# Patient Record
Sex: MALE | Race: OTHER | HISPANIC OR LATINO | URBAN - METROPOLITAN AREA
[De-identification: names, ages, dates, MRNs, and addresses within clinical notes are randomized per-mention and may not be internally consistent; named-entity substitution may affect disease eponyms.]

---

## 2020-09-25 ENCOUNTER — OUTPATIENT (OUTPATIENT)
Dept: OUTPATIENT SERVICES | Facility: HOSPITAL | Age: 20
LOS: 1 days | End: 2020-09-25

## 2020-09-25 ENCOUNTER — TRANSCRIPTION ENCOUNTER (OUTPATIENT)
Age: 20
End: 2020-09-25

## 2020-09-25 VITALS
OXYGEN SATURATION: 98 % | TEMPERATURE: 97 F | SYSTOLIC BLOOD PRESSURE: 106 MMHG | WEIGHT: 110.01 LBS | DIASTOLIC BLOOD PRESSURE: 66 MMHG | HEART RATE: 91 BPM | HEIGHT: 65.5 IN | RESPIRATION RATE: 16 BRPM

## 2020-09-25 DIAGNOSIS — K08.409 PARTIAL LOSS OF TEETH, UNSPECIFIED CAUSE, UNSPECIFIED CLASS: Chronic | ICD-10-CM

## 2020-09-25 DIAGNOSIS — M26.02 MAXILLARY HYPOPLASIA: ICD-10-CM

## 2020-09-25 LAB
BLD GP AB SCN SERPL QL: NEGATIVE — SIGNIFICANT CHANGE UP
HCT VFR BLD CALC: 47.8 % — SIGNIFICANT CHANGE UP (ref 39–50)
HGB BLD-MCNC: 15.7 G/DL — SIGNIFICANT CHANGE UP (ref 13–17)
MCHC RBC-ENTMCNC: 26.5 PG — LOW (ref 27–34)
MCHC RBC-ENTMCNC: 32.8 % — SIGNIFICANT CHANGE UP (ref 32–36)
MCV RBC AUTO: 80.7 FL — SIGNIFICANT CHANGE UP (ref 80–100)
NRBC # FLD: 0 K/UL — SIGNIFICANT CHANGE UP (ref 0–0)
PLATELET # BLD AUTO: 234 K/UL — SIGNIFICANT CHANGE UP (ref 150–400)
PMV BLD: 9.3 FL — SIGNIFICANT CHANGE UP (ref 7–13)
RBC # BLD: 5.92 M/UL — HIGH (ref 4.2–5.8)
RBC # FLD: 11.4 % — SIGNIFICANT CHANGE UP (ref 10.3–14.5)
RH IG SCN BLD-IMP: POSITIVE — SIGNIFICANT CHANGE UP
WBC # BLD: 5.7 K/UL — SIGNIFICANT CHANGE UP (ref 3.8–10.5)
WBC # FLD AUTO: 5.7 K/UL — SIGNIFICANT CHANGE UP (ref 3.8–10.5)

## 2020-09-25 RX ORDER — SODIUM CHLORIDE 9 MG/ML
1000 INJECTION, SOLUTION INTRAVENOUS
Refills: 0 | Status: DISCONTINUED | OUTPATIENT
Start: 2020-10-01 | End: 2020-10-02

## 2020-09-25 NOTE — H&P PST ADULT - NSANTHOSAYNRD_GEN_A_CORE
never tested/No. LESLIE screening performed.  STOP BANG Legend: 0-2 = LOW Risk; 3-4 = INTERMEDIATE Risk; 5-8 = HIGH Risk

## 2020-09-25 NOTE — H&P PST ADULT - ASSESSMENT
20 yrs old male with h/o maxillary hypoplasia and mandibular hyperplasia presents for preop eval to have LEFORT 1 mandibular sagittal split osteotomy

## 2020-09-25 NOTE — H&P PST ADULT - NSICDXPROBLEM_GEN_ALL_CORE_FT
PROBLEM DIAGNOSES  Problem: Maxillary hypoplasia  Assessment and Plan:        PROBLEM DIAGNOSES  Problem: Maxillary hypoplasia  Assessment and Plan: Pt scheduled for LEFORT 1 mandibular sagittal spilt soteotomy.  labs pending,  preop instructions provided to pt.   Famotidine provided to pt with instructions.  Pt's COVID testing is on 9/28/20

## 2020-09-25 NOTE — H&P PST ADULT - RS GEN PE MLT RESP DETAILS PC
no wheezes/good air movement/respirations non-labored/clear to auscultation bilaterally/no rales/no rhonchi

## 2020-09-26 DIAGNOSIS — Z01.818 ENCOUNTER FOR OTHER PREPROCEDURAL EXAMINATION: ICD-10-CM

## 2020-09-26 PROBLEM — Z00.00 ENCOUNTER FOR PREVENTIVE HEALTH EXAMINATION: Status: ACTIVE | Noted: 2020-09-26

## 2020-09-28 ENCOUNTER — APPOINTMENT (OUTPATIENT)
Dept: DISASTER EMERGENCY | Facility: CLINIC | Age: 20
End: 2020-09-28

## 2020-09-29 LAB — SARS-COV-2 N GENE NPH QL NAA+PROBE: NOT DETECTED

## 2020-10-01 ENCOUNTER — TRANSCRIPTION ENCOUNTER (OUTPATIENT)
Age: 20
End: 2020-10-01

## 2020-10-01 ENCOUNTER — INPATIENT (INPATIENT)
Facility: HOSPITAL | Age: 20
LOS: 0 days | Discharge: HOME | End: 2020-10-02
Attending: DENTIST | Admitting: DENTIST

## 2020-10-01 VITALS
RESPIRATION RATE: 17 BRPM | TEMPERATURE: 98 F | WEIGHT: 110.01 LBS | OXYGEN SATURATION: 98 % | SYSTOLIC BLOOD PRESSURE: 118 MMHG | HEIGHT: 65.5 IN | HEART RATE: 82 BPM | DIASTOLIC BLOOD PRESSURE: 50 MMHG

## 2020-10-01 DIAGNOSIS — M26.02 MAXILLARY HYPOPLASIA: ICD-10-CM

## 2020-10-01 DIAGNOSIS — K08.409 PARTIAL LOSS OF TEETH, UNSPECIFIED CAUSE, UNSPECIFIED CLASS: Chronic | ICD-10-CM

## 2020-10-01 LAB — RH IG SCN BLD-IMP: POSITIVE — SIGNIFICANT CHANGE UP

## 2020-10-01 RX ORDER — METOCLOPRAMIDE HCL 10 MG
10 TABLET ORAL ONCE
Refills: 0 | Status: COMPLETED | OUTPATIENT
Start: 2020-10-01 | End: 2020-10-02

## 2020-10-01 RX ORDER — FLUTICASONE PROPIONATE 50 MCG
1 SPRAY, SUSPENSION NASAL ONCE
Refills: 0 | Status: DISCONTINUED | OUTPATIENT
Start: 2020-10-01 | End: 2020-10-02

## 2020-10-01 RX ORDER — HYDROMORPHONE HYDROCHLORIDE 2 MG/ML
0.25 INJECTION INTRAMUSCULAR; INTRAVENOUS; SUBCUTANEOUS
Refills: 0 | Status: DISCONTINUED | OUTPATIENT
Start: 2020-10-01 | End: 2020-10-02

## 2020-10-01 RX ORDER — IBUPROFEN 200 MG
600 TABLET ORAL EVERY 6 HOURS
Refills: 0 | Status: DISCONTINUED | OUTPATIENT
Start: 2020-10-01 | End: 2020-10-02

## 2020-10-01 RX ORDER — PENICILLIN G POTASSIUM 5000000 [IU]/1
2 POWDER, FOR SOLUTION INTRAMUSCULAR; INTRAPLEURAL; INTRATHECAL; INTRAVENOUS EVERY 4 HOURS
Refills: 0 | Status: DISCONTINUED | OUTPATIENT
Start: 2020-10-01 | End: 2020-10-02

## 2020-10-01 RX ORDER — SODIUM CHLORIDE 0.65 %
1 AEROSOL, SPRAY (ML) NASAL
Refills: 0 | Status: DISCONTINUED | OUTPATIENT
Start: 2020-10-01 | End: 2020-10-02

## 2020-10-01 RX ORDER — ACETAMINOPHEN 500 MG
650 TABLET ORAL EVERY 6 HOURS
Refills: 0 | Status: DISCONTINUED | OUTPATIENT
Start: 2020-10-01 | End: 2020-10-02

## 2020-10-01 RX ORDER — SODIUM CHLORIDE 9 MG/ML
1000 INJECTION, SOLUTION INTRAVENOUS
Refills: 0 | Status: DISCONTINUED | OUTPATIENT
Start: 2020-10-01 | End: 2020-10-02

## 2020-10-01 RX ORDER — MORPHINE SULFATE 50 MG/1
2 CAPSULE, EXTENDED RELEASE ORAL ONCE
Refills: 0 | Status: DISCONTINUED | OUTPATIENT
Start: 2020-10-01 | End: 2020-10-02

## 2020-10-01 RX ORDER — OXYCODONE HYDROCHLORIDE 5 MG/1
5 TABLET ORAL EVERY 4 HOURS
Refills: 0 | Status: DISCONTINUED | OUTPATIENT
Start: 2020-10-01 | End: 2020-10-02

## 2020-10-01 RX ORDER — HYDROMORPHONE HYDROCHLORIDE 2 MG/ML
0.5 INJECTION INTRAMUSCULAR; INTRAVENOUS; SUBCUTANEOUS
Refills: 0 | Status: DISCONTINUED | OUTPATIENT
Start: 2020-10-01 | End: 2020-10-02

## 2020-10-01 RX ORDER — OXYMETAZOLINE HYDROCHLORIDE 0.5 MG/ML
1 SPRAY NASAL
Refills: 0 | Status: DISCONTINUED | OUTPATIENT
Start: 2020-10-01 | End: 2020-10-02

## 2020-10-01 RX ORDER — ONDANSETRON 8 MG/1
4 TABLET, FILM COATED ORAL ONCE
Refills: 0 | Status: COMPLETED | OUTPATIENT
Start: 2020-10-01 | End: 2020-10-02

## 2020-10-01 RX ORDER — ONDANSETRON 8 MG/1
4 TABLET, FILM COATED ORAL EVERY 8 HOURS
Refills: 0 | Status: DISCONTINUED | OUTPATIENT
Start: 2020-10-01 | End: 2020-10-02

## 2020-10-01 RX ORDER — CHLORHEXIDINE GLUCONATE 213 G/1000ML
15 SOLUTION TOPICAL
Refills: 0 | Status: DISCONTINUED | OUTPATIENT
Start: 2020-10-01 | End: 2020-10-02

## 2020-10-01 RX ORDER — OXYCODONE HYDROCHLORIDE 5 MG/1
10 TABLET ORAL EVERY 4 HOURS
Refills: 0 | Status: DISCONTINUED | OUTPATIENT
Start: 2020-10-01 | End: 2020-10-02

## 2020-10-01 RX ADMIN — SODIUM CHLORIDE 30 MILLILITER(S): 9 INJECTION, SOLUTION INTRAVENOUS at 08:57

## 2020-10-01 RX ADMIN — HYDROMORPHONE HYDROCHLORIDE 0.5 MILLIGRAM(S): 2 INJECTION INTRAMUSCULAR; INTRAVENOUS; SUBCUTANEOUS at 22:45

## 2020-10-01 RX ADMIN — HYDROMORPHONE HYDROCHLORIDE 0.5 MILLIGRAM(S): 2 INJECTION INTRAMUSCULAR; INTRAVENOUS; SUBCUTANEOUS at 22:35

## 2020-10-01 NOTE — H&P ADULT - NSHPSOCIALHISTORY_GEN_ALL_CORE
ocial History:  · Marital Status	Single  · Occupation	unemployed  · Lives With	parents   Substance Use History:  · Substance Use	never used  caffeine   Alcohol Use History:  · Have you ever consumed alcohol	yes...  · Alcohol Type	wine; liquor  · Alcohol Frequency	monthly or less  socially only  · Problems Related to Alcohol Use	no  · 1. Have you felt you ought to CUT down on your drinking?	no  · 2. Have people ANNOYED you by criticizing your drinking?	no  · 3. Have you ever felt bad or GUILTY about your drinking?	no  · 4. Have you ever needed an "EYE OPENER", a drink first thing in the morning to steady your nerves or get rid of a hangover?	no     Tobacco Usage Definitions:   Tobacco Usage:  · Tobacco Usage: Never smoker   Passive Smoke Exposure:  · Passive Smoke Exposure	No

## 2020-10-01 NOTE — H&P ADULT - NSHPREVIEWOFSYSTEMS_GEN_ALL_CORE
Review of Systems:   · Negative General Symptoms	no fever; no chills  · Skin/Breast	negative  · Ophthalmologic	negative  · ENMT	negative  · Negative Respiratory and Thorax Symptoms	no wheezing; no dyspnea; no cough  · Negative Cardiovascular Symptoms	no chest pain; no palpitations; no dyspnea on exertion  · Gastrointestinal	negative  · Genitourinary	negative  · Musculoskeletal	negative  · Negative Neurological Symptoms	no weakness; no paresthesias; no difficulty walking  · Psychiatric	negative  · Hematology/Lymphatics	negative  · Negative Endocrine Symptoms	no cold intolerance; no heat intolerance  · Allergic/Immunologic	negative

## 2020-10-01 NOTE — H&P ADULT - NSHPPHYSICALEXAM_GEN_ALL_CORE
Physical Exam:  · Constitutional	well-developed; well-groomed; well-nourished; no distress  · Eyes	EOMI; PERRL; no drainage or redness  · ENMT	braces upper and lower teeth  · Neck	supple; no JVD  · Breasts	not examined  · Back	No deformity or limitation of movement  · Respiratory	good air movement; respirations non-labored; clear to auscultation bilaterally; no rales; no rhonchi; no wheezes  · Cardiovascular	  · Cardiovascular Details	regular rate and rhythm  no rub  · Cardiovascular Details	positive S1; positive S2  · Gastrointestinal	Soft, non-tender, no hepatosplenomegaly, normal bowel sounds  · Genitourinary	patient refused  · Rectal	patient refused  · Extremities	No cyanosis, clubbing or edema  · Vascular	Equal and normal pulses (carotid, femoral, dorsalis pedis)  · Neurological	Alert & oriented; no sensory, motor or coordination deficits, normal reflexes  · Skin	No lesions; no rash  · Lymph Nodes	No lymphadedenopathy  · Musculoskeletal	No joint pain, swelling or deformity; no limitation of movement  · Psychiatric	normal affect; normal behavior

## 2020-10-01 NOTE — H&P ADULT - ASSESSMENT
20 yrs old male with h/o maxillary hypoplasia and mandibular hyperplasia presents for preop eval to have LEFORT 1 mandibular sagittal split osteotomy on 10/01/2020.

## 2020-10-01 NOTE — H&P ADULT - HISTORY OF PRESENT ILLNESS
20 yrs old male with h/o maxillary hypoplasia and mandibular hyperplasia presents for preop eval to have LEFORT 1 and mandibular sagittal split osteotomy on 10/01/2020.

## 2020-10-02 ENCOUNTER — TRANSCRIPTION ENCOUNTER (OUTPATIENT)
Age: 20
End: 2020-10-02

## 2020-10-02 VITALS
RESPIRATION RATE: 18 BRPM | OXYGEN SATURATION: 98 % | TEMPERATURE: 98 F | DIASTOLIC BLOOD PRESSURE: 76 MMHG | HEART RATE: 100 BPM | SYSTOLIC BLOOD PRESSURE: 115 MMHG

## 2020-10-02 RX ORDER — CHLORHEXIDINE GLUCONATE 213 G/1000ML
15 SOLUTION TOPICAL
Qty: 210 | Refills: 0
Start: 2020-10-02 | End: 2020-10-08

## 2020-10-02 RX ORDER — FLUTICASONE PROPIONATE 50 MCG
1 SPRAY, SUSPENSION NASAL
Qty: 7 | Refills: 0
Start: 2020-10-02 | End: 2020-10-08

## 2020-10-02 RX ORDER — OXYCODONE HYDROCHLORIDE 5 MG/1
10 TABLET ORAL
Qty: 120 | Refills: 0
Start: 2020-10-02 | End: 2020-10-04

## 2020-10-02 RX ORDER — AMOXICILLIN 250 MG/5ML
10 SUSPENSION, RECONSTITUTED, ORAL (ML) ORAL
Qty: 210 | Refills: 0
Start: 2020-10-02 | End: 2020-10-08

## 2020-10-02 RX ORDER — IBUPROFEN 200 MG
30 TABLET ORAL
Qty: 840 | Refills: 0
Start: 2020-10-02 | End: 2020-10-08

## 2020-10-02 RX ORDER — OXYCODONE HYDROCHLORIDE 5 MG/1
5 TABLET ORAL
Qty: 60 | Refills: 0
Start: 2020-10-02 | End: 2020-10-04

## 2020-10-02 RX ORDER — OXYMETAZOLINE HYDROCHLORIDE 0.5 MG/ML
1 SPRAY NASAL
Qty: 14 | Refills: 0
Start: 2020-10-02 | End: 2020-10-08

## 2020-10-02 RX ORDER — ACETAMINOPHEN 500 MG
20.31 TABLET ORAL
Qty: 568.68 | Refills: 0
Start: 2020-10-02 | End: 2020-10-08

## 2020-10-02 RX ORDER — INFLUENZA VIRUS VACCINE 15; 15; 15; 15 UG/.5ML; UG/.5ML; UG/.5ML; UG/.5ML
0.5 SUSPENSION INTRAMUSCULAR ONCE
Refills: 0 | Status: DISCONTINUED | OUTPATIENT
Start: 2020-10-02 | End: 2020-10-02

## 2020-10-02 RX ORDER — SODIUM CHLORIDE 0.65 %
1 AEROSOL, SPRAY (ML) NASAL
Qty: 84 | Refills: 0
Start: 2020-10-02 | End: 2020-10-08

## 2020-10-02 RX ADMIN — Medication 10 MILLIGRAM(S): at 04:21

## 2020-10-02 RX ADMIN — OXYMETAZOLINE HYDROCHLORIDE 1 SPRAY(S): 0.5 SPRAY NASAL at 05:05

## 2020-10-02 RX ADMIN — OXYCODONE HYDROCHLORIDE 5 MILLIGRAM(S): 5 TABLET ORAL at 16:41

## 2020-10-02 RX ADMIN — OXYCODONE HYDROCHLORIDE 5 MILLIGRAM(S): 5 TABLET ORAL at 17:12

## 2020-10-02 RX ADMIN — Medication 1 SPRAY(S): at 08:30

## 2020-10-02 RX ADMIN — PENICILLIN G POTASSIUM 100 MILLION UNIT(S): 5000000 POWDER, FOR SOLUTION INTRAMUSCULAR; INTRAPLEURAL; INTRATHECAL; INTRAVENOUS at 00:14

## 2020-10-02 RX ADMIN — PENICILLIN G POTASSIUM 100 MILLION UNIT(S): 5000000 POWDER, FOR SOLUTION INTRAMUSCULAR; INTRAPLEURAL; INTRATHECAL; INTRAVENOUS at 05:05

## 2020-10-02 RX ADMIN — Medication 1 SPRAY(S): at 10:32

## 2020-10-02 RX ADMIN — Medication 600 MILLIGRAM(S): at 06:00

## 2020-10-02 RX ADMIN — Medication 1 SPRAY(S): at 11:32

## 2020-10-02 RX ADMIN — Medication 650 MILLIGRAM(S): at 12:13

## 2020-10-02 RX ADMIN — Medication 650 MILLIGRAM(S): at 04:29

## 2020-10-02 RX ADMIN — Medication 650 MILLIGRAM(S): at 03:59

## 2020-10-02 RX ADMIN — Medication 1 SPRAY(S): at 00:47

## 2020-10-02 RX ADMIN — Medication 1 SPRAY(S): at 04:01

## 2020-10-02 RX ADMIN — ONDANSETRON 4 MILLIGRAM(S): 8 TABLET, FILM COATED ORAL at 00:12

## 2020-10-02 RX ADMIN — Medication 650 MILLIGRAM(S): at 11:43

## 2020-10-02 RX ADMIN — CHLORHEXIDINE GLUCONATE 15 MILLILITER(S): 213 SOLUTION TOPICAL at 05:05

## 2020-10-02 RX ADMIN — PENICILLIN G POTASSIUM 100 MILLION UNIT(S): 5000000 POWDER, FOR SOLUTION INTRAMUSCULAR; INTRAPLEURAL; INTRATHECAL; INTRAVENOUS at 10:30

## 2020-10-02 RX ADMIN — Medication 1 SPRAY(S): at 09:30

## 2020-10-02 RX ADMIN — Medication 1 SPRAY(S): at 00:35

## 2020-10-02 RX ADMIN — Medication 1 SPRAY(S): at 01:55

## 2020-10-02 RX ADMIN — Medication 1 SPRAY(S): at 03:59

## 2020-10-02 RX ADMIN — Medication 1 SPRAY(S): at 12:00

## 2020-10-02 RX ADMIN — Medication 1 SPRAY(S): at 07:13

## 2020-10-02 RX ADMIN — Medication 600 MILLIGRAM(S): at 05:05

## 2020-10-02 NOTE — DISCHARGE NOTE PROVIDER - NSDCFUADDINST_GEN_ALL_CORE_FT
Sinus precaution  -No use of straw  -No spitting  -No blowing nose   -Sneeze while mouth open   -No pressure to face Sinus precaution  -No use of straw  -No spitting  -No blowing nose   -No pressure to face  -Sneeze while mouth open

## 2020-10-02 NOTE — DISCHARGE NOTE PROVIDER - NSDCMRMEDTOKEN_GEN_ALL_CORE_FT
acetaminophen 160 mg/5 mL oral suspension: 20.31 milliliter(s) orally every 6 hours  amoxicillin 250 mg/5 mL oral suspension: 10 milliliter(s) orally 3 times a day   fluticasone 50 mcg/inh nasal spray: 1 spray(s) nasal once  ibuprofen 100 mg/5 mL oral suspension: 30 milliliter(s) orally every 6 hours  oxyCODONE 5 mg/5 mL oral solution: 5 milliliter(s) orally every 6 hours MDD:20ml prn moderate pain 4-6  oxyCODONE 5 mg/5 mL oral solution: 10 milliliter(s) orally every 6 hours MDD:40ml prn severe pain 7-10  oxymetazoline 0.05% nasal spray: 1 spray(s) nasal 2 times a day   sodium chloride 0.65% nasal spray: 1 spray(s) nasal every 2 hours prn nasal congestion   acetaminophen 160 mg/5 mL oral suspension: 20.31 milliliter(s) orally every 6 hours  amoxicillin 250 mg/5 mL oral suspension: 10 milliliter(s) orally 3 times a day   fluticasone 50 mcg/inh nasal spray: 1 spray(s) nasal once  ibuprofen 100 mg/5 mL oral suspension: 30 milliliter(s) orally every 6 hours  oxyCODONE 5 mg/5 mL oral solution: 5 milliliter(s) orally every 6 hours MDD:20ml prn moderate pain 4-6  oxyCODONE 5 mg/5 mL oral solution: 10 milliliter(s) orally every 6 hours MDD:40ml prn severe pain 7-10  oxymetazoline 0.05% nasal spray: 1 spray(s) nasal 2 times a day   Peridex 0.12% mucous membrane liquid: 15 milliliter(s) orally 2 times a day   sodium chloride 0.65% nasal spray: 1 spray(s) nasal every 2 hours prn nasal congestion

## 2020-10-02 NOTE — DISCHARGE NOTE PROVIDER - NSDCCPTREATMENT_GEN_ALL_CORE_FT
PRINCIPAL PROCEDURE  Procedure: Le Fort I osteotomy with bilateral sagittal split mandibular osteotomy and repair using bone graft  Findings and Treatment:

## 2020-10-02 NOTE — DISCHARGE NOTE PROVIDER - CARE PROVIDER_API CALL
Roberto Christie  ORAL/MAXILLOFACIAL SURGERY  [id number] [address] [place] [address]  Phone: [phone]  Fax: [phone]  Follow Up Time: 1 week

## 2020-10-02 NOTE — DISCHARGE NOTE NURSING/CASE MANAGEMENT/SOCIAL WORK - PATIENT PORTAL LINK FT
You can access the FollowMyHealth Patient Portal offered by [facility] by registering at the following website: [email or web address]. By joining [employer]’s FollowMyHealth portal, you will also be able to view your health information using other applications (apps) compatible with our system.

## 2020-10-02 NOTE — DISCHARGE NOTE NURSING/CASE MANAGEMENT/SOCIAL WORK - NSDCPNINST_GEN_ALL_CORE
Watch for signs of infection; redness, swelling, fever, chills or heat, report such symptoms to the MD. No driving while taking pain medication, it causes drowsiness & constipation.

## 2020-10-02 NOTE — PROGRESS NOTE ADULT - ASSESSMENT
A/P: 19yo male HD1 8-hours s/p LeFort 1 osteotomy and BSSO - patient is recovering uneventfully, - d/c   - Encourage ambulation, independent voiding, and increase PO intake  - Standing 600mg ibuprofen / 650mg acetaminophen, 5mg/10mg oxycodone PRN, 2mg morphine breakthrough  - Penicillin G Potassium q4h  - 4mg Zofran q8h first-line for nausea/vomiting, 10mg Reglan x1 second-line  - Please contact [facility] with questions/concerns. A/P: 19yo male HD1 8-hours s/p LeFort 1 osteotomy and BSSO - patient is recovering uneventfully,    - Encourage ambulation, independent voiding, and increase PO intake  - Standing 600mg ibuprofen / 650mg acetaminophen, 5mg/10mg oxycodone PRN, 2mg morphine breakthrough  - Penicillin G Potassium q4h  - 4mg Zofran q8h first-line for nausea/vomiting, 10mg Reglan x1 second-line  - Please contact OMFS with questions/concerns.

## 2020-10-02 NOTE — DISCHARGE NOTE PROVIDER - NSDCCPCAREPLAN_GEN_ALL_CORE_FT
PRINCIPAL DISCHARGE DIAGNOSIS  Diagnosis: Mandibular hyperplasia  Assessment and Plan of Treatment:       SECONDARY DISCHARGE DIAGNOSES  Diagnosis: Jaw asymmetry  Assessment and Plan of Treatment:

## 2020-10-02 NOTE — DISCHARGE NOTE PROVIDER - NSDCACTIVITY_GEN_ALL_CORE
Walking - Outdoors allowed/Showering allowed/Stairs allowed/No heavy lifting/straining/Walking - Indoors allowed/Driving allowed/Return to Work/School allowed/Bathing allowed

## 2020-10-02 NOTE — DISCHARGE NOTE PROVIDER - HOSPITAL COURSE
10/1/2020 21 yo male 4-hours s/p LeFort I, BSSO . Patient extubated to PACU uneventfully and transferred to floor bed. SHANKAR or nausea reported since completion of surgery. AVSS and WNL. Pain is currently well-controlled with 600mg ibuprofen q6h and 650mg acetaminophen q6h (standing). LR @ 90ml/hr. Continued abx coverage with 2 mil units Penicillin G q4h. Surgical sites hemostatic. Occlusion stable and held with proper positioning via 24 guage wires and polymer splint. Wire cutters at bedside. Dhillon, A-line, and NGT removed upon floor transfer. Patient has ambulated, independently voided bedside, and is tolerating 1/2 cup PO intake at this time.        10/2/2020 21 yo male 1day s/p LeFort I, BSSO with Dr. Casarez and Dr. Christie. SHANKAR o/n. AVSS. Pt is progressing well.

## 2020-10-02 NOTE — PROGRESS NOTE ADULT - SUBJECTIVE AND OBJECTIVE BOX
21 yo male HD1, 8-hours s/p LeFort I, BSSO with Dr. Casarez and Dr. Christie. Patient extubated to PACU uneventfully and transferred to floor bed. SHANKAR or nausea reported since completion of surgery. AVSS and WNL. Pain is currently well-controlled with 600mg ibuprofen q6h and 650mg acetaminophen q6h (standing). LR @ 90ml/hr. Continued abx coverage with 2 mil units Penicillin G q4h. Surgical sites hemostatic. Occlusion stable and held with proper positioning via 24 guage wires. Wire cutters at bedside. Dhillon, A-line, and NGT removed upon floor transfer. Patient has ambulated, independently voided, and is tolerating PO intake at this time.    Vital Signs Last 24 Hrs  T(C): 36.6 (02 Oct 2020 05:06), Max: 36.8 (01 Oct 2020 08:16)  T(F): 97.8 (02 Oct 2020 05:06), Max: 98.3 (02 Oct 2020 01:19)  HR: 99 (02 Oct 2020 05:06) (82 - 108)  BP: 117/76 (02 Oct 2020 05:06) (112/54 - 130/67)  BP(mean): 83 (01 Oct 2020 22:10) (73 - 83)  RR: 19 (02 Oct 2020 05:06) (14 - 21)  SpO2: 100% (02 Oct 2020 05:06) (95% - 100%)      01 Oct 2020 07:01  -  02 Oct 2020 07:00  --------------------------------------------------------  IN:    Lactated Ringers: 720 mL  Total IN: 720 mL  OUT:    Indwelling Catheter - Urethral (mL): 200 mL    Nasogastric/Oral tube (mL): 25 mL    Voided (mL): 1000 mL  Total OUT: 1225 mL  Total NET: -505 mL    Limited Maxillofacial Exam:  Gen: NAD, cooperative, resting  EOE: Moderate bilateral mid-facial and lip edema consistent with the expected post-operative state. Jaw-bra unsoiled and in place providing compression with ice. No apparent ecchymosis/hematomas. No rhinorrhea or nasal discharge noted.   IOE: Maxillary/mandibular midlines uniform and coincident with facial midline. Surgical sites hemostatic with sutures c/i. No intraoral hematoma noted. +b/l maxillary vestibular edema consistent with procedure. Occlusion stable/reproducible supported by intact/correctly positioned associated 24 guage wires. Wire cutters remain at bedside.           21 yo male HD1, 8-hours s/p LeFort I, BSSO with Dr. Casarez and Dr. Christie. Patient extubated to PACU uneventfully and transferred to floor bed. SHANKAR or nausea reported since completion of surgery. AVSS and WNL. Pain is currently well-controlled with 600mg ibuprofen q6h and 650mg acetaminophen q6h (standing). LR @ 90ml/hr. Continued abx coverage with 2 mil units Penicillin G q4h. Surgical sites hemostatic. Occlusion stable and held with proper positioning via 24 guage wires and polymer splint. Wire cutters at bedside. Dhillon, A-line, and NGT removed upon floor transfer. Patient has ambulated, independently voided bedside, and is tolerating 1/2 cup PO intake at this time.    Vital Signs Last 24 Hrs  T(C): 36.6 (02 Oct 2020 05:06), Max: 36.8 (01 Oct 2020 08:16)  T(F): 97.8 (02 Oct 2020 05:06), Max: 98.3 (02 Oct 2020 01:19)  HR: 99 (02 Oct 2020 05:06) (82 - 108)  BP: 117/76 (02 Oct 2020 05:06) (112/54 - 130/67)  BP(mean): 83 (01 Oct 2020 22:10) (73 - 83)  RR: 19 (02 Oct 2020 05:06) (14 - 21)  SpO2: 100% (02 Oct 2020 05:06) (95% - 100%)      01 Oct 2020 07:01  -  02 Oct 2020 07:00  --------------------------------------------------------  IN:    Lactated Ringers: 720 mL  Total IN: 720 mL  OUT:    Indwelling Catheter - Urethral (mL): 200 mL    Nasogastric/Oral tube (mL): 25 mL    Voided (mL): 1000 mL  Total OUT: 1225 mL  Total NET: -505 mL    Limited Maxillofacial Exam:  Gen: NAD, cooperative, resting  EOE: Moderate bilateral mid-facial and lip edema consistent with the expected post-operative state. Jaw-bra unsoiled and in place providing compression with ice. No apparent ecchymosis/hematomas. No rhinorrhea or nasal discharge noted.   IOE: Maxillary/mandibular midlines uniform and coincident with facial midline. Surgical sites hemostatic with sutures c/i. No intraoral hematoma noted. +b/l maxillary vestibular edema consistent with procedure. Occlusion stable/reproducible supported by intact/correctly positioned associated 24 guage wires and polymer splint. Wire cutters remain at bedside.
